# Patient Record
Sex: FEMALE | Race: WHITE | NOT HISPANIC OR LATINO | Employment: UNEMPLOYED | URBAN - METROPOLITAN AREA
[De-identification: names, ages, dates, MRNs, and addresses within clinical notes are randomized per-mention and may not be internally consistent; named-entity substitution may affect disease eponyms.]

---

## 2017-07-03 ENCOUNTER — ANESTHESIA EVENT (OUTPATIENT)
Dept: SURGERY | Facility: CLINIC | Age: 67
End: 2017-07-03

## 2017-07-03 ENCOUNTER — ANESTHESIA (OUTPATIENT)
Dept: SURGERY | Facility: CLINIC | Age: 67
End: 2017-07-03

## 2017-07-03 ENCOUNTER — SURGERY (OUTPATIENT)
Age: 67
End: 2017-07-03

## 2017-07-03 ENCOUNTER — HOSPITAL ENCOUNTER (OUTPATIENT)
Facility: CLINIC | Age: 67
Discharge: HOME | End: 2017-07-03
Attending: DENTIST | Admitting: DENTIST

## 2017-07-03 VITALS
TEMPERATURE: 97.2 F | OXYGEN SATURATION: 93 % | HEIGHT: 69 IN | RESPIRATION RATE: 18 BRPM | SYSTOLIC BLOOD PRESSURE: 140 MMHG | DIASTOLIC BLOOD PRESSURE: 95 MMHG | WEIGHT: 217 LBS | BODY MASS INDEX: 32.14 KG/M2

## 2017-07-03 DIAGNOSIS — K01.1 IMPACTED TOOTH: Primary | ICD-10-CM

## 2017-07-03 LAB — GLUCOSE BLDC GLUCOMTR-MCNC: 120 MG/DL (ref 70–99)

## 2017-07-03 PROCEDURE — 40000170 ZZH STATISTIC PRE-PROCEDURE ASSESSMENT II: Performed by: DENTIST

## 2017-07-03 PROCEDURE — 71000012 ZZH RECOVERY PHASE 1 LEVEL 1 FIRST HR: Performed by: DENTIST

## 2017-07-03 PROCEDURE — 37000008 ZZH ANESTHESIA TECHNICAL FEE, 1ST 30 MIN: Performed by: DENTIST

## 2017-07-03 PROCEDURE — 82962 GLUCOSE BLOOD TEST: CPT

## 2017-07-03 PROCEDURE — 25000132 ZZH RX MED GY IP 250 OP 250 PS 637: Performed by: DENTIST

## 2017-07-03 PROCEDURE — 25000128 H RX IP 250 OP 636: Performed by: NURSE ANESTHETIST, CERTIFIED REGISTERED

## 2017-07-03 PROCEDURE — 25000125 ZZHC RX 250: Performed by: NURSE ANESTHETIST, CERTIFIED REGISTERED

## 2017-07-03 PROCEDURE — 25000128 H RX IP 250 OP 636: Performed by: DENTIST

## 2017-07-03 PROCEDURE — 71000027 ZZH RECOVERY PHASE 2 EACH 15 MINS: Performed by: DENTIST

## 2017-07-03 PROCEDURE — 36000056 ZZH SURGERY LEVEL 3 1ST 30 MIN: Performed by: DENTIST

## 2017-07-03 PROCEDURE — 25000128 H RX IP 250 OP 636: Performed by: ANESTHESIOLOGY

## 2017-07-03 PROCEDURE — 27210794 ZZH OR GENERAL SUPPLY STERILE: Performed by: DENTIST

## 2017-07-03 PROCEDURE — 25000125 ZZHC RX 250: Performed by: DENTIST

## 2017-07-03 PROCEDURE — 36000058 ZZH SURGERY LEVEL 3 EA 15 ADDTL MIN: Performed by: DENTIST

## 2017-07-03 PROCEDURE — 37000009 ZZH ANESTHESIA TECHNICAL FEE, EACH ADDTL 15 MIN: Performed by: DENTIST

## 2017-07-03 PROCEDURE — 27210995 ZZH RX 272: Performed by: DENTIST

## 2017-07-03 RX ORDER — ONDANSETRON 4 MG/1
4 TABLET, ORALLY DISINTEGRATING ORAL EVERY 30 MIN PRN
Status: DISCONTINUED | OUTPATIENT
Start: 2017-07-03 | End: 2017-07-03 | Stop reason: HOSPADM

## 2017-07-03 RX ORDER — ONDANSETRON 4 MG/1
4 TABLET, ORALLY DISINTEGRATING ORAL
Status: DISCONTINUED | OUTPATIENT
Start: 2017-07-03 | End: 2017-07-03 | Stop reason: HOSPADM

## 2017-07-03 RX ORDER — ONDANSETRON 2 MG/ML
4 INJECTION INTRAMUSCULAR; INTRAVENOUS EVERY 30 MIN PRN
Status: DISCONTINUED | OUTPATIENT
Start: 2017-07-03 | End: 2017-07-03 | Stop reason: HOSPADM

## 2017-07-03 RX ORDER — LIDOCAINE HCL/EPINEPHRINE/PF 2%-1:200K
VIAL (ML) INJECTION PRN
Status: DISCONTINUED | OUTPATIENT
Start: 2017-07-03 | End: 2017-07-03 | Stop reason: HOSPADM

## 2017-07-03 RX ORDER — PROPOFOL 10 MG/ML
INJECTION, EMULSION INTRAVENOUS CONTINUOUS PRN
Status: DISCONTINUED | OUTPATIENT
Start: 2017-07-03 | End: 2017-07-03

## 2017-07-03 RX ORDER — CEFAZOLIN SODIUM 2 G/100ML
2 INJECTION, SOLUTION INTRAVENOUS
Status: COMPLETED | OUTPATIENT
Start: 2017-07-03 | End: 2017-07-03

## 2017-07-03 RX ORDER — FENTANYL CITRATE 50 UG/ML
25-50 INJECTION, SOLUTION INTRAMUSCULAR; INTRAVENOUS
Status: DISCONTINUED | OUTPATIENT
Start: 2017-07-03 | End: 2017-07-03 | Stop reason: HOSPADM

## 2017-07-03 RX ORDER — SODIUM CHLORIDE, SODIUM LACTATE, POTASSIUM CHLORIDE, CALCIUM CHLORIDE 600; 310; 30; 20 MG/100ML; MG/100ML; MG/100ML; MG/100ML
INJECTION, SOLUTION INTRAVENOUS CONTINUOUS
Status: DISCONTINUED | OUTPATIENT
Start: 2017-07-03 | End: 2017-07-03 | Stop reason: HOSPADM

## 2017-07-03 RX ORDER — HYDROCODONE BITARTRATE AND ACETAMINOPHEN 5; 325 MG/1; MG/1
1-2 TABLET ORAL
Status: COMPLETED | OUTPATIENT
Start: 2017-07-03 | End: 2017-07-03

## 2017-07-03 RX ORDER — SODIUM CHLORIDE, SODIUM LACTATE, POTASSIUM CHLORIDE, CALCIUM CHLORIDE 600; 310; 30; 20 MG/100ML; MG/100ML; MG/100ML; MG/100ML
INJECTION, SOLUTION INTRAVENOUS CONTINUOUS PRN
Status: DISCONTINUED | OUTPATIENT
Start: 2017-07-03 | End: 2017-07-03

## 2017-07-03 RX ORDER — IBUPROFEN 600 MG/1
600 TABLET, FILM COATED ORAL
Status: DISCONTINUED | OUTPATIENT
Start: 2017-07-03 | End: 2017-07-03 | Stop reason: HOSPADM

## 2017-07-03 RX ORDER — CEFAZOLIN SODIUM 1 G/3ML
1 INJECTION, POWDER, FOR SOLUTION INTRAMUSCULAR; INTRAVENOUS SEE ADMIN INSTRUCTIONS
Status: DISCONTINUED | OUTPATIENT
Start: 2017-07-03 | End: 2017-07-03 | Stop reason: HOSPADM

## 2017-07-03 RX ORDER — IBUPROFEN 600 MG/1
600 TABLET, FILM COATED ORAL EVERY 6 HOURS PRN
Qty: 30 TABLET | Refills: 0 | Status: SHIPPED | OUTPATIENT
Start: 2017-07-03

## 2017-07-03 RX ORDER — NALOXONE HYDROCHLORIDE 0.4 MG/ML
.1-.4 INJECTION, SOLUTION INTRAMUSCULAR; INTRAVENOUS; SUBCUTANEOUS
Status: DISCONTINUED | OUTPATIENT
Start: 2017-07-03 | End: 2017-07-03 | Stop reason: HOSPADM

## 2017-07-03 RX ORDER — MAGNESIUM HYDROXIDE 1200 MG/15ML
LIQUID ORAL PRN
Status: DISCONTINUED | OUTPATIENT
Start: 2017-07-03 | End: 2017-07-03 | Stop reason: HOSPADM

## 2017-07-03 RX ORDER — NEOSTIGMINE METHYLSULFATE 1 MG/ML
VIAL (ML) INJECTION PRN
Status: DISCONTINUED | OUTPATIENT
Start: 2017-07-03 | End: 2017-07-03

## 2017-07-03 RX ORDER — PROPOFOL 10 MG/ML
INJECTION, EMULSION INTRAVENOUS PRN
Status: DISCONTINUED | OUTPATIENT
Start: 2017-07-03 | End: 2017-07-03

## 2017-07-03 RX ORDER — HYDROCODONE BITARTRATE AND ACETAMINOPHEN 5; 325 MG/1; MG/1
1-2 TABLET ORAL EVERY 4 HOURS PRN
Qty: 15 TABLET | Refills: 0 | Status: SHIPPED | OUTPATIENT
Start: 2017-07-03

## 2017-07-03 RX ORDER — MEPERIDINE HYDROCHLORIDE 25 MG/ML
12.5 INJECTION INTRAMUSCULAR; INTRAVENOUS; SUBCUTANEOUS
Status: DISCONTINUED | OUTPATIENT
Start: 2017-07-03 | End: 2017-07-03 | Stop reason: HOSPADM

## 2017-07-03 RX ORDER — LIDOCAINE HYDROCHLORIDE 20 MG/ML
INJECTION, SOLUTION INFILTRATION; PERINEURAL PRN
Status: DISCONTINUED | OUTPATIENT
Start: 2017-07-03 | End: 2017-07-03

## 2017-07-03 RX ORDER — GLYCOPYRROLATE 0.2 MG/ML
INJECTION, SOLUTION INTRAMUSCULAR; INTRAVENOUS PRN
Status: DISCONTINUED | OUTPATIENT
Start: 2017-07-03 | End: 2017-07-03

## 2017-07-03 RX ORDER — AMOXICILLIN 500 MG/1
500 CAPSULE ORAL 3 TIMES DAILY
Qty: 30 CAPSULE | Refills: 0 | Status: SHIPPED | OUTPATIENT
Start: 2017-07-03

## 2017-07-03 RX ORDER — CHLORHEXIDINE GLUCONATE ORAL RINSE 1.2 MG/ML
10 SOLUTION DENTAL ONCE
Status: COMPLETED | OUTPATIENT
Start: 2017-07-03 | End: 2017-07-03

## 2017-07-03 RX ORDER — FENTANYL CITRATE 50 UG/ML
INJECTION, SOLUTION INTRAMUSCULAR; INTRAVENOUS PRN
Status: DISCONTINUED | OUTPATIENT
Start: 2017-07-03 | End: 2017-07-03

## 2017-07-03 RX ADMIN — CHLORHEXIDINE GLUCONATE 15 ML: 1.2 RINSE ORAL at 12:02

## 2017-07-03 RX ADMIN — LIDOCAINE HYDROCHLORIDE 100 MG: 20 INJECTION, SOLUTION INFILTRATION; PERINEURAL at 13:28

## 2017-07-03 RX ADMIN — PROPOFOL: 10 INJECTION, EMULSION INTRAVENOUS at 14:03

## 2017-07-03 RX ADMIN — CEFAZOLIN SODIUM 2 G: 2 INJECTION, SOLUTION INTRAVENOUS at 13:30

## 2017-07-03 RX ADMIN — GLYCOPYRROLATE 0.4 MG: 0.2 INJECTION, SOLUTION INTRAMUSCULAR; INTRAVENOUS at 14:20

## 2017-07-03 RX ADMIN — PROPOFOL 50 MG: 10 INJECTION, EMULSION INTRAVENOUS at 13:33

## 2017-07-03 RX ADMIN — PROPOFOL 200 MCG/KG/MIN: 10 INJECTION, EMULSION INTRAVENOUS at 13:28

## 2017-07-03 RX ADMIN — ROCURONIUM BROMIDE 30 MG: 10 INJECTION INTRAVENOUS at 13:28

## 2017-07-03 RX ADMIN — NEOSTIGMINE METHYLSULFATE 2 MG: 1 INJECTION INTRAMUSCULAR; INTRAVENOUS; SUBCUTANEOUS at 14:20

## 2017-07-03 RX ADMIN — FENTANYL CITRATE 50 MCG: 50 INJECTION, SOLUTION INTRAMUSCULAR; INTRAVENOUS at 13:28

## 2017-07-03 RX ADMIN — HYDROCODONE BITARTRATE AND ACETAMINOPHEN 1 TABLET: 5; 325 TABLET ORAL at 15:05

## 2017-07-03 RX ADMIN — SODIUM CHLORIDE, POTASSIUM CHLORIDE, SODIUM LACTATE AND CALCIUM CHLORIDE: 600; 310; 30; 20 INJECTION, SOLUTION INTRAVENOUS at 13:26

## 2017-07-03 RX ADMIN — LIDOCAINE HYDROCHLORIDE,EPINEPHRINE BITARTRATE 6 ML: 20; .005 INJECTION, SOLUTION EPIDURAL; INFILTRATION; INTRACAUDAL; PERINEURAL at 14:09

## 2017-07-03 RX ADMIN — PROPOFOL 130 MG: 10 INJECTION, EMULSION INTRAVENOUS at 13:28

## 2017-07-03 RX ADMIN — SODIUM CHLORIDE 300 ML: 0.9 IRRIGANT IRRIGATION at 14:11

## 2017-07-03 RX ADMIN — MEPERIDINE HYDROCHLORIDE 12.5 MG: 25 INJECTION, SOLUTION INTRAMUSCULAR; INTRAVENOUS; SUBCUTANEOUS at 15:27

## 2017-07-03 RX ADMIN — FENTANYL CITRATE 50 MCG: 50 INJECTION, SOLUTION INTRAMUSCULAR; INTRAVENOUS at 14:30

## 2017-07-03 ASSESSMENT — ENCOUNTER SYMPTOMS: SEIZURES: 0

## 2017-07-03 NOTE — ANESTHESIA PREPROCEDURE EVALUATION
"Procedure: Procedure(s):  COSMETIC EXTRACTION(S) DENTAL  Preop diagnosis: IMPACTED TEETH, DENTAL ROOT CARIES, PATHOLOGICAL RESORPTION OF TEETH  Allergies   Allergen Reactions     Geodon [Ziprasidone]      Unsure if this is true, pt may be attempting not to take this med per medic report.     Lisinopril      Risperidone      There is no problem list on file for this patient.    Past Medical History:   Diagnosis Date     Anxiety      Arthritis     OA     Diabetes (H)      Gastroesophageal reflux disease      High cholesterol      Hypertension      Insomnia      Melanoma in situ of face (H)      Mixed conductive and sensorineural hearing loss of right ear      Obese      Overactive bladder      Rosacea      Schizoaffective disorder (H)     BIPOLAR TYPE     Past Surgical History:   Procedure Laterality Date     COLONOSCOPY       ENT SURGERY       GENITOURINARY SURGERY         No current facility-administered medications on file prior to encounter.   Current Outpatient Prescriptions on File Prior to Encounter:  METFORMIN HCL PO Take 1,000 mg by mouth 2 times daily (with meals)   Multiple Vitamins-Minerals (MULTIVITAMIN OR) Take by mouth daily   OMEPRAZOLE PO Take 20 mg by mouth every morning   RANITIDINE HCL PO Take 300 mg by mouth At Bedtime   SIMVASTATIN PO Take 20 mg by mouth At Bedtime     /80  Temp 35.6  C (96  F) (Oral)  Resp 20  Ht 1.753 m (5' 9\")  Wt 98.4 kg (217 lb)  SpO2 94%  BMI 32.05 kg/m2    K 4.3  HGB 13.7  ELG - SR, nl axis     Anesthesia Evaluation     . Pt has had prior anesthetic.     No history of anesthetic complications          ROS/MED HX    ENT/Pulmonary: Comment: Nodule left nares per patient.    Breathes better through right nares     (-) sleep apnea and recent URI   Neurologic: Comment: insomnia     (-) seizures, CVA and migraines   Cardiovascular:     (+) Dyslipidemia, hypertension----. : . . . :. .      (-) CHF and WILSON   METS/Exercise Tolerance:     Hematologic:  - neg hematologic  " ROS       Musculoskeletal:   (+) arthritis, , , -       GI/Hepatic:     (+) GERD       Renal/Genitourinary:         Endo:     (+) type II DM Obesity, .      Psychiatric: Comment: schizoaffective disorder    (+) psychiatric history anxiety and depression      Infectious Disease:  - neg infectious disease ROS       Malignancy:         Other: Comment: Hearing loss                    Physical Exam      Airway   Mallampati: II  TM distance: <3 FB  Neck ROM: full  Comment: micrognathic    Dental   Comment: Dental caries    Cardiovascular   Rhythm and rate: regular and normal      Pulmonary    breath sounds clear to auscultation                    Anesthesia Plan      History & Physical Review  History and physical reviewed and following examination; no interval change.    ASA Status:  3 .    NPO Status:  > 8 hours    Plan for General and ETT with Propofol induction. Maintenance will be TIVA.    PONV prophylaxis:  Ondansetron (or other 5HT-3) and Dexamethasone or Solumedrol       Postoperative Care      Consents  Anesthetic plan, risks, benefits and alternatives discussed with:  Patient..                          .

## 2017-07-03 NOTE — IP AVS SNAPSHOT
MRN:[id number]                      After Visit Summary   7/3/2017    Lexy Valenzuela    MRN: [id number]           Thank you!     Thank you for choosing [facility] for your care. Our goal is always to provide you with excellent care. Hearing back from our patients is one way we can continue to improve our services. Please take a few minutes to complete the written survey that you may receive in the mail after you visit with us. Thank you!        Patient Information     Date Of Birth          1950        About your hospital stay     You were admitted on:  July 3, 2017 You last received care in the:  [facility]    You were discharged on:  July 3, 2017       Who to Call     For medical emergencies, please call 911.  For non-urgent questions about your medical care, please call your primary care provider or clinic, [phone]  For questions related to your surgery, please call your surgery clinic        Attending Provider     Provider Specialty    Gualberto London DDS Oral Surgery       Primary Care Provider Office Phone # Fax #    Michael Jimenes [phone]      After Care Instructions     Diet Instructions       Resume pre-procedure diet            Discharge Instructions       Call to make appointment with Dr. London in 7-10 days, [phone]            Dressing       Gauze pack with pressure over extraction site if bleeding, keep in place 30 minutes.  No need for gauze if no active bleeding.            Ice to affected area       Ice to operative site PRN            No lifting        No lifting over 30 lbs and no strenuous physical activity for 1 weeks                  Further instructions from your care team       Same Day Surgery Discharge Instructions for  Sedation and General Anesthesia       It's not unusual to feel dizzy, light-headed or faint for up to 24 hours after surgery or while taking pain medication.  If you have these symptoms:  sit for a few minutes before standing and have someone assist you when you get up to walk or use the bathroom.      You should rest and relax for the next 24 hours. We recommend you make arrangements to have an adult stay with you for at least 24 hours after your discharge.  Avoid hazardous and strenuous activity.      DO NOT DRIVE any vehicle or operate mechanical equipment for 24 hours following the end of your surgery.  Even though you may feel normal, your reactions may be affected by the medication you have received.      Do not drink alcoholic beverages for 24 hours following surgery.       Slowly progress to your regular diet as you feel able. It's not unusual to feel nauseated and/or vomit after receiving anesthesia.  If you develop these symptoms, drink clear liquids (apple juice, ginger ale, broth, 7-up, etc. ) until you feel better.  If your nausea and vomiting persists for 24 hours, please notify your surgeon.        All narcotic pain medications, along with inactivity and anesthesia, can cause constipation. Drinking plenty of liquids and increasing fiber intake will help.      For any questions of a medical nature, call your surgeon.      Do not make important decisions for 24 hours.      If you had general anesthesia, you may have a sore throat for a couple of days related to the breathing tube used during surgery.  You may use Cepacol lozenges to help with this discomfort.  If it worsens or if you develop a fever, contact your surgeon.       If you feel your pain is not well managed with the pain medications prescribed by your surgeon, please contact your surgeon's office to let them know so they can address your concerns.     MOUTH CARE FOLLOWING ORAL SURGERY  [facility]  Orlando Spicer Gulbrandsen, T[place], Elise Sanchez. Surya Pandey Neuner, [place]      Immediately following your surgery:   - When you get home, remove the gauze.  Do not replace unless  "you see active bleeding (pooling/dripping).   - Begin brushing your teeth the evening of surgery.   - Avoid rigorous exercise and get adequate rest for the first two to three days.   - If antibiotics are prescribed, please begin taking these the day of surgery.  If you are nauseated, wait until the next day.   - Do not drive for 24 hours after having I.V. Anesthesia.  Do not drive while taking a prescription pain medication (not including prescription ibuprofen).   - For the first 7 days, avoid smoking, spitting, drinking with straws, or vigorous rinsing.    1.  BLEEDING:  Some oozing may continue for the first 24-48 hours and is not unexpected.  If bleeding persists, apply the gauze directly over the extraction site and bite down firmly for 30 minutes.  You may remove the gauze to eat or drink and replace if needed.  2.  SWELLING:  Apply ice packs on and off for 30 minutes for 24-48 hours after your surgery, excluding overnight, to the outside of your face over the surgery site(s).  Do not apply heat.  Swelling is to be expected following surgery and peaks 2-3 days after.  Elevating your head in the first 48 hours will minimize swelling.  3.  TOBACCO:  Do not smoke or use tobacco products for 7 days.  Smoking greatly increases your risk of infections and dry sockets and will delay healing.  4.  RINSES:  Beginning the day after surgery, dissolve 1/4 teaspoon of salt in a full glass of warm water.  Rinse four times per day (after meals and before bed) for one week.  If given a prescription mouth rinse, use that in addition to salt water rinses for one week, starting the day of surgery.  5.  DIET:  After surgery while you are still numb, eat cool, soft foods.  Once numbness wears off, eat any diet you can tolerate excluding peanuts, popcorn, chips and other crunchy foods that are likely to become \"stuck\" in extraction sockets.  6.  REST & FLUIDS:  After I.V. anesthesia, drink plenty of fluids.  Be sure to get 8-10 " hours of sleep at night.  Do NOT use straws for one week following surgery.  7.  PAIN:  If given prescription ibuprofen, take as directed.  Otherwise we recommend 600mg (three over-the-counter ibuprofen) immediately after surgery and then every 6 hours for two to three days.  The stronger pain medication may be used in addition if necessary (make sure to take with food to avoid nausea).   8.  FEVER:  A low grade fever is likely.  If questionable, do not hesitate to call.  9.  NAUSEA:  Nausea may occur following general anesthesia.  Treat with clear liquids and advance diet as tolerated.  If vomiting is a problem, call our office.  10.  STITCHES:  If stitches were used, they are usually dissolvable.  You will be advised by our office if you have sutures that require removal at a later appointment.  11.  DRY SOCKETS:  Soreness is common for the first couple of days after surgery.  If pain increases (throbbing, waking up at night) after 3-6 days, call our office.  12.  NUMBNESS:  We often use a long-acting local anesthetic that may remain in effect the entire day.        EMERGENCY CALLS  If you have questions or concerns, please call our office between the hours of 7am to 4pm Monday through Friday.  If you have an emergency, please call our office; if during non-business hours, the answering service will contact the doctor on call.  We do not refill pain prescriptions during the evenings or weekends.    [facility]  [phone]      **If you have questions or concerns about your procedure,   call Dr. London at [phone]**            Pending Results     No orders found from 7/1/2017 to 7/4/2017.            Admission Information     Date & Time Provider Department Dept. Phone    7/3/2017 Gualberto London DDS [facility] Same Day Surgery [phone]      Your Vitals Were     Blood Pressure Temperature Respirations Height Weight Pulse  "Oximetry    138/75 97.2  F (36.2  C) 22 1.753 m (5' 9\") 98.4 kg (217 lb) 96%    BMI (Body Mass Index)                   32.05 kg/m2           Good[employer]elly Information     [facility] lets you send messages to your doctor, view your test results, renew your prescriptions, schedule appointments and more. To sign up, go to [email or web address] . Click on \"Log in\" on the left side of the screen, which will take you to the Welcome page. Then click on \"Sign up Now\" on the right side of the page.     You will be asked to enter the access code listed below, as well as some personal information. Please follow the directions to create your username and password.     Your access code is: [email or web address]  Expires: 10/1/2017  2:25 PM     Your access code will  in 90 days. If you need help or a new code, please call your [facility] clinic or [phone].        Care EveryWhere ID     This is your Care EveryWhere ID. This could be used by other organizations to access your [facility] medical records  [id number]        Equal Access to Services     STACIA ECKERT AH: Hadii love Morton, wadorada luasaf, qaybta kaalmada margy, sheila mcdermott. So [facility] [phone].    ATENCIÓN: Si habla español, tiene a ervin disposición servicios gratuitos de asistencia lingüística. Raisa al [phone].    We comply with applicable federal civil rights laws and [place] laws. We do not discriminate on the basis of race, color, national origin, age, disability sex, sexual orientation or gender identity.               Review of your medicines      START taking        Dose / Directions    amoxicillin 500 MG capsule   Commonly known as:  AMOXIL        Dose:  500 mg   Take 1 capsule (500 mg) by mouth 3 times daily   Quantity:  30 capsule   Refills:  0       HYDROcodone-acetaminophen 5-325 MG per tablet   Commonly known as:  NORCO   Notes to Patient:  One norco pain pill given at 3:05 PM        Dose:  1-2 tablet   Take 1-2 " tablets by mouth every 4 hours as needed for other (Moderate to Severe Pain)   Quantity:  15 tablet   Refills:  0       ibuprofen 600 MG tablet   Commonly known as:  ADVIL/MOTRIN        Dose:  600 mg   Take 1 tablet (600 mg) by mouth every 6 hours as needed for pain (mild)   Quantity:  30 tablet   Refills:  0         CONTINUE these medicines which have NOT CHANGED        Dose / Directions    ABILIFY PO        Dose:  20 mg   Take 20 mg by mouth daily   Refills:  0       ASPIRIN PO        Dose:  81 mg   Take 81 mg by mouth daily   Refills:  0       COZAAR PO        Dose:  25 mg   Take 25 mg by mouth daily   Refills:  0       * DEPAKOTE PO        Dose:  500 mg   Take 500 mg by mouth daily (250 MG TABLETS X 2   Refills:  0       * DEPAKOTE PO        Dose:  1500 mg   Take 1,500 mg by mouth At Bedtime (TAKES 3  500 MG TABLETS)   Refills:  0       DETROL LA PO        Dose:  2 mg   Take 2 mg by mouth daily   Refills:  0       METFORMIN HCL PO        Dose:  1000 mg   Take 1,000 mg by mouth 2 times daily (with meals)   Refills:  0       MULTIVITAMIN PO        Take by mouth daily   Refills:  0       OMEPRAZOLE PO        Dose:  20 mg   Take 20 mg by mouth every morning   Refills:  0       RANITIDINE HCL PO        Dose:  300 mg   Take 300 mg by mouth At Bedtime   Refills:  0       SIMVASTATIN PO        Dose:  20 mg   Take 20 mg by mouth At Bedtime   Refills:  0       ULTRAM PO        Dose:  50 mg   Take 50 mg by mouth 2 times daily   Refills:  0       * Notice:  This list has 2 medication(s) that are the same as other medications prescribed for you. Read the directions carefully, and ask your doctor or other care provider to review them with you.         Where to get your medicines      These medications were sent to [facility] - [address]  [address] [place] [address]     Phone:  [phone]     amoxicillin 500 MG capsule    ibuprofen 600 MG tablet         Some of these will  need a paper prescription and others can be bought over the counter. Ask your nurse if you have questions.     Bring a paper prescription for each of these medications     HYDROcodone-acetaminophen 5-325 MG per tablet                Protect others around you: Learn how to safely use, store and throw away your medicines at www.disposemymeds.org.             Medication List: This is a list of all your medications and when to take them. Check marks below indicate your daily home schedule. Keep this list as a reference.      Medications           Morning Afternoon Evening Bedtime As Needed    ABILIFY PO   Take 20 mg by mouth daily                                amoxicillin 500 MG capsule   Commonly known as:  AMOXIL   Take 1 capsule (500 mg) by mouth 3 times daily                                ASPIRIN PO   Take 81 mg by mouth daily                                COZAAR PO   Take 25 mg by mouth daily                                * DEPAKOTE PO   Take 500 mg by mouth daily (250 MG TABLETS X 2                                * DEPAKOTE PO   Take 1,500 mg by mouth At Bedtime (TAKES 3  500 MG TABLETS)                                DETROL LA PO   Take 2 mg by mouth daily                                HYDROcodone-acetaminophen 5-325 MG per tablet   Commonly known as:  NORCO   Take 1-2 tablets by mouth every 4 hours as needed for other (Moderate to Severe Pain)   Last time this was given:  1 tablet on 7/3/2017  3:05 PM   Notes to Patient:  One norco pain pill given at 3:05 PM                                ibuprofen 600 MG tablet   Commonly known as:  ADVIL/MOTRIN   Take 1 tablet (600 mg) by mouth every 6 hours as needed for pain (mild)                                METFORMIN HCL PO   Take 1,000 mg by mouth 2 times daily (with meals)                                MULTIVITAMIN PO   Take by mouth daily                                OMEPRAZOLE PO   Take 20 mg by mouth every morning                                RANITIDINE  HCL PO   Take 300 mg by mouth At Bedtime                                SIMVASTATIN PO   Take 20 mg by mouth At Bedtime                                ULTRAM PO   Take 50 mg by mouth 2 times daily                                * Notice:  This list has 2 medication(s) that are the same as other medications prescribed for you. Read the directions carefully, and ask your doctor or other care provider to review them with you.

## 2017-07-03 NOTE — OP NOTE
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  Clinical and radiographic examination reveals deep recurrent caries under the crown of tooth #31.  Tooth #32 is deeply impacted with distal pericoronal radiolucency, and pathological internal resorption.  The root structure of tooth #32 consists of multiple root trunks diverting and curving in all four directions, with intimate association and undermining of the inferior alveolar canal.  High risk for permanent damage to the inferior alveolar nerve with extraction.  Decision was made to proceed with extraction of tooth #31 and coronectomy of tooth #32.  The patient understands the need for the procedure and agrees to proceed as planned.    Consent: All risks, including but not limited to pain, bleeding, swelling, infection, temporary or permanent numbness to the lip/chin/cheek/tongue or teeth, osteomyelitis, jaw fracture, damage to adjacent structures or teeth, loss of taste, need for further surgery and unforeseen complications, as well as the benefits and alternatives of the procedure, were discussed.  The patient understood and agreed to proceed as planned.  All questions were answered preoperatively.    Procedure: On 7/3/17 the patient was identified and greeted in the preoperative holding area.  Informed consent was obtained, procedure was reviewed.  She was taken to the OR by the anesthesia team, laid supine on table, standard ASA monitors placed, and IV induction into anesthesia with nasotracheal  intubation performed.  Eyes protected.  Tube secured passively.  Time-out called.  Throat pack placed.  Local anesthesia administered, 5cc 2% lidocaine with 1/100,000 epinephrine via inferior alveolar nerve block and local infiltration.  Distobuccal releasing incision made at tooth #31 and extended sulcularly to tooth #30.  Full thickness flap raised.  Tooth #31 extracted intact with #23 cowhorn forcep.  Site curetted and bone smoothed with bone file.  Barrel bur utilized to uncover tooth #32.  Crown was removed with fissure bur.  Barrel bur and fissure bur utilized to reduce remaining tooth structure to past the root furcation.  No mobility of roots noted.  No infection of roots noted.  No visualization of nerve or nerve canal noted.  Distal bone defect curetted.  Bone smoothed with bone file.  Site irrigated copiously.  Closure with 3-0 chromic gut sutures, #31 site closed as well.  Site noted to be hemostatic.  Gauze placed to-go with ribbon attached.  Throat pack removed.  Care of patient handed off to anesthesia team.  Procedure as planned.

## 2017-07-03 NOTE — IP AVS SNAPSHOT
[facility] Same Day [facility]    [address]    [place] [address]    Phone:  [phone]    Fax:  [phone]                                       After Visit Summary   7/3/2017    Lexy Valenzuela    MRN: [id number]           After Visit Summary Signature Page     I have received my discharge instructions, and my questions have been answered. I have discussed any challenges I see with this plan with the nurse or doctor.    ..........................................................................................................................................  Patient/Patient Representative Signature      ..........................................................................................................................................  Patient Representative Print Name and Relationship to Patient    ..................................................               ................................................  Date                                            Time    ..........................................................................................................................................  Reviewed by Signature/Title    ...................................................              ..............................................  Date                                                            Time

## 2017-07-03 NOTE — DISCHARGE INSTRUCTIONS
Same Day Surgery Discharge Instructions for  Sedation and General Anesthesia       It's not unusual to feel dizzy, light-headed or faint for up to 24 hours after surgery or while taking pain medication.  If you have these symptoms: sit for a few minutes before standing and have someone assist you when you get up to walk or use the bathroom.      You should rest and relax for the next 24 hours. We recommend you make arrangements to have an adult stay with you for at least 24 hours after your discharge.  Avoid hazardous and strenuous activity.      DO NOT DRIVE any vehicle or operate mechanical equipment for 24 hours following the end of your surgery.  Even though you may feel normal, your reactions may be affected by the medication you have received.      Do not drink alcoholic beverages for 24 hours following surgery.       Slowly progress to your regular diet as you feel able. It's not unusual to feel nauseated and/or vomit after receiving anesthesia.  If you develop these symptoms, drink clear liquids (apple juice, ginger ale, broth, 7-up, etc. ) until you feel better.  If your nausea and vomiting persists for 24 hours, please notify your surgeon.        All narcotic pain medications, along with inactivity and anesthesia, can cause constipation. Drinking plenty of liquids and increasing fiber intake will help.      For any questions of a medical nature, call your surgeon.      Do not make important decisions for 24 hours.      If you had general anesthesia, you may have a sore throat for a couple of days related to the breathing tube used during surgery.  You may use Cepacol lozenges to help with this discomfort.  If it worsens or if you develop a fever, contact your surgeon.       If you feel your pain is not well managed with the pain medications prescribed by your surgeon, please contact your surgeon's office to let them know so they can address your concerns.     MOUTH CARE FOLLOWING ORAL SURGERY  [facility]  Orlando Spicer, Lucas, Brenna, Elise Sanchez. Surya Pandey [place], [place]      Immediately following your surgery:   - When you get home, remove the gauze.  Do not replace unless you see active bleeding (pooling/dripping).   - Begin brushing your teeth the evening of surgery.   - Avoid rigorous exercise and get adequate rest for the first two to three days.   - If antibiotics are prescribed, please begin taking these the day of surgery.  If you are nauseated, wait until the next day.   - Do not drive for 24 hours after having I.V. Anesthesia.  Do not drive while taking a prescription pain medication (not including prescription ibuprofen).   - For the first 7 days, avoid smoking, spitting, drinking with straws, or vigorous rinsing.    1.  BLEEDING:  Some oozing may continue for the first 24-48 hours and is not unexpected.  If bleeding persists, apply the gauze directly over the extraction site and bite down firmly for 30 minutes.  You may remove the gauze to eat or drink and replace if needed.  2.  SWELLING:  Apply ice packs on and off for 30 minutes for 24-48 hours after your surgery, excluding overnight, to the outside of your face over the surgery site(s).  Do not apply heat.  Swelling is to be expected following surgery and peaks 2-3 days after.  Elevating your head in the first 48 hours will minimize swelling.  3.  TOBACCO:  Do not smoke or use tobacco products for 7 days.  Smoking greatly increases your risk of infections and dry sockets and will delay healing.  4.  RINSES:  Beginning the day after surgery, dissolve 1/4 teaspoon of salt in a full glass of warm water.  Rinse four times per day (after meals and before bed) for one week.  If given a prescription mouth rinse, use that in addition to salt water rinses for one week, starting the day of surgery.  5.  DIET:  After surgery while you are still numb, eat cool, soft foods.  Once numbness wears off, eat  "any diet you can tolerate excluding peanuts, popcorn, chips and other crunchy foods that are likely to become \"stuck\" in extraction sockets.  6.  REST & FLUIDS:  After I.V. anesthesia, drink plenty of fluids.  Be sure to get 8-10 hours of sleep at night.  Do NOT use straws for one week following surgery.  7.  PAIN:  If given prescription ibuprofen, take as directed.  Otherwise we recommend 600mg (three over-the-counter ibuprofen) immediately after surgery and then every 6 hours for two to three days.  The stronger pain medication may be used in addition if necessary (make sure to take with food to avoid nausea).   8.  FEVER:  A low grade fever is likely.  If questionable, do not hesitate to call.  9.  NAUSEA:  Nausea may occur following general anesthesia.  Treat with clear liquids and advance diet as tolerated.  If vomiting is a problem, call our office.  10.  STITCHES:  If stitches were used, they are usually dissolvable.  You will be advised by our office if you have sutures that require removal at a later appointment.  11.  DRY SOCKETS:  Soreness is common for the first couple of days after surgery.  If pain increases (throbbing, waking up at night) after 3-6 days, call our office.  12.  NUMBNESS:  We often use a long-acting local anesthetic that may remain in effect the entire day.        EMERGENCY CALLS  If you have questions or concerns, please call our office between the hours of 7am to 4pm Monday through Friday.  If you have an emergency, please call our office; if during non-business hours, the answering service will contact the doctor on call.  We do not refill pain prescriptions during the evenings or weekends.    Griselda Uribe  [facility]  [phone]      **If you have questions or concerns about your procedure,   call Dr. London at [phone]**          "

## 2017-07-03 NOTE — ANESTHESIA CARE TRANSFER NOTE
Patient: Lexy Valenzuela    Procedure(s):  CORONECTOMY OF TOOTH #32 AND EXTRACTION OF TOOTH #31 - Wound Class: II-Clean Contaminated    Diagnosis: IMPACTED TEETH, DENTAL ROOT CARIES, PATHOLOGICAL RESORPTION OF TEETH  Diagnosis Additional Information: No value filed.    Anesthesia Type:   General, ETT     Note:  Airway :Face Mask  Patient transferred to:PACU  Comments: 1425:  To par responsive.      Vitals: (Last set prior to Anesthesia Care Transfer)    CRNA VITALS  7/3/2017 1355 - 7/3/2017 1425      7/3/2017             NIBP: (!)  153/114    NIBP Mean: 139                Electronically Signed By: HARDIK Tyson CRNA  July 3, 2017  2:25 PM

## 2017-07-03 NOTE — ANESTHESIA POSTPROCEDURE EVALUATION
Patient: Lexy Valenzuela    Procedure(s):  CORONECTOMY OF TOOTH #32 AND EXTRACTION OF TOOTH #31 - Wound Class: II-Clean Contaminated    Diagnosis:IMPACTED TEETH, DENTAL ROOT CARIES, PATHOLOGICAL RESORPTION OF TEETH  Diagnosis Additional Information: No value filed.    Anesthesia Type:  General, ETT    Note:  Anesthesia Post Evaluation    Patient location during evaluation: PACU  Patient participation: Able to fully participate in evaluation  Level of consciousness: awake  Pain management: adequate  Airway patency: patent  Cardiovascular status: acceptable  Respiratory status: acceptable  Hydration status: acceptable  PONV: none     Anesthetic complications: None          Last vitals:  Vitals:    07/03/17 1515 07/03/17 1530 07/03/17 1545   BP: 134/72 (!) 138/92 (!) 140/95   Resp: 18 18 18   Temp: 36.2  C (97.2  F)     SpO2: 96% 97% 93%         Electronically Signed By: Edwin Jansen MD  July 3, 2017  4:38 PM

## 2017-07-03 NOTE — BRIEF OP NOTE
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  Resorption of impacted tooth #32 with distal pericoronal defect.  Coronectomy to roots performed.  Nerve not visualized.

## 2022-06-02 ENCOUNTER — OFFICE VISIT (OUTPATIENT)
Dept: URGENT CARE | Facility: URGENT CARE | Age: 72
End: 2022-06-02
Payer: MEDICARE

## 2022-06-02 ENCOUNTER — ANCILLARY PROCEDURE (OUTPATIENT)
Dept: GENERAL RADIOLOGY | Facility: CLINIC | Age: 72
End: 2022-06-02
Attending: PHYSICIAN ASSISTANT
Payer: MEDICARE

## 2022-06-02 VITALS
SYSTOLIC BLOOD PRESSURE: 138 MMHG | OXYGEN SATURATION: 98 % | WEIGHT: 217 LBS | HEART RATE: 107 BPM | BODY MASS INDEX: 32.05 KG/M2 | DIASTOLIC BLOOD PRESSURE: 84 MMHG | TEMPERATURE: 98.7 F

## 2022-06-02 DIAGNOSIS — M25.532 LEFT WRIST PAIN: ICD-10-CM
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
## 2022-06-02 DIAGNOSIS — S61.459A DOG BITE OF HAND, INITIAL ENCOUNTER: ICD-10-CM

## 2022-06-02 DIAGNOSIS — M25.532 LEFT WRIST PAIN: Primary | ICD-10-CM

## 2022-06-02 PROCEDURE — 73110 X-RAY EXAM OF WRIST: CPT | Mod: TC | Performed by: RADIOLOGY

## 2022-06-02 PROCEDURE — 99203 OFFICE O/P NEW LOW 30 MIN: CPT | Performed by: PHYSICIAN ASSISTANT

## 2022-06-02 NOTE — PROGRESS NOTES
Assessment & Plan     Left wrist pain  Xray negative for acute fx. Ice, rest, elevation. Ibuprofen for pain or swelling.    - XR Wrist Left G/E 3 Views  - amoxicillin-clavulanate (AUGMENTIN) 875-125 MG tablet  Dispense: 14 tablet; Refill: 0    Dog bite of hand, initial encounter  Augmentin prophylaxis given hand involvement.   Close observation.  Seek care for any signs of infection.    Wounds cleaned well and dressed with steri strips.      - amoxicillin-clavulanate (AUGMENTIN) 875-125 MG tablet  Dispense: 14 tablet; Refill: 0     56}    [facility] [place]    Isreal Pugh is a 72 year old female who presents to clinic today for the following health issues:  Chief Complaint   Patient presents with     Urgent Care     Dog Bite     C/O dog bite on hands     HPI  TD 2016         Review of Systems        Objective    /84   Pulse 107   Temp 98.7  F (37.1  C) (Tympanic)   Wt 98.4 kg (217 lb)   SpO2 98%   BMI 32.05 kg/m    Physical Exam exam of BUE reveals wounds as below.     Push fluids, rest and ibuprofen or tylenol for comfort.    Pain with palpation of the L distal wrist, mild dorsal swelling.    Pain with AROM in flexion extension pronation and supination.    Sensation and peripheral pulses intact, cap refill brisk.

## 2022-06-03 NOTE — PATIENT INSTRUCTIONS
Start antibiotics as ordered.    Ice, rest.     Keep wound clean and dry   Wash with warm water and a mild soap such as dove for sensitive skin or ivory 2 x per day.    Apply a small amount of topical antibiotic outmentt, light dressing if draining or open to the air.      If worsening redness, swelling. If any fevers, worsening swelling, pain, any concerns seek care immediately.  If fever or rapidly worsening please seek care in the ER.      Your tetanus is up to date.    Dog must be isolated for 10 days.    If well at that time no concern for rabies.    If dog is ill or euthanized should be tested for rabies and you must seek care if positive for rabies.

## 2023-08-07 ENCOUNTER — TELEPHONE (OUTPATIENT)
Dept: FAMILY MEDICINE | Facility: CLINIC | Age: 73
End: 2023-08-07
Payer: MEDICARE

## 2023-08-07 NOTE — TELEPHONE ENCOUNTER
PT called about a B12 injection question.  No order on chart.  PT thought there was an appt.  Directed pt to call her PCP clinic.  (After call, I see pt most likely called the wrong clinic. PT will be calling the [facility].)  AUGUSTINA Escobar

## (undated) DEVICE — GLOVE PROTEXIS W/NEU-THERA 6.5  2D73TE65

## (undated) DEVICE — GLOVE PROTEXIS W/NEU-THERA 7.5  2D73TE75

## (undated) DEVICE — SPONGE BALL KERLIX ROUND XL W/O STRING LATEX 4935

## (undated) DEVICE — BUR OVAL LINVATEC 7X70MM CARBIDE 5092-236

## (undated) DEVICE — PACK HEAD NECK SEN15HNFSF

## (undated) DEVICE — GOWN LG DISP 9515

## (undated) DEVICE — BUR OVAL LINVATEC 4X8MM 5091-236

## (undated) DEVICE — SOL NACL 0.9% IRRIG 1000ML BOTTLE 07138-09

## (undated) DEVICE — BUR DENTAL 1.75X75MM STRAIGHT 560

## (undated) DEVICE — LINEN TOWEL PACK X5 5464

## (undated) DEVICE — SU CHROMIC 3-0 SH 27" G122H

## (undated) DEVICE — SUCTION CANISTER MEDIVAC LINER 3000ML W/LID 65651-530

## (undated) RX ORDER — FENTANYL CITRATE 50 UG/ML
INJECTION, SOLUTION INTRAMUSCULAR; INTRAVENOUS
Status: DISPENSED
Start: 2017-07-03

## (undated) RX ORDER — HYDROCODONE BITARTRATE AND ACETAMINOPHEN 5; 325 MG/1; MG/1
TABLET ORAL
Status: DISPENSED
Start: 2017-07-03

## (undated) RX ORDER — MEPERIDINE HYDROCHLORIDE 25 MG/ML
INJECTION INTRAMUSCULAR; INTRAVENOUS; SUBCUTANEOUS
Status: DISPENSED
Start: 2017-07-03

## (undated) RX ORDER — LIDOCAINE HYDROCHLORIDE 20 MG/ML
INJECTION, SOLUTION EPIDURAL; INFILTRATION; INTRACAUDAL; PERINEURAL
Status: DISPENSED
Start: 2017-07-03

## (undated) RX ORDER — PROPOFOL 10 MG/ML
INJECTION, EMULSION INTRAVENOUS
Status: DISPENSED
Start: 2017-07-03

## (undated) RX ORDER — LIDOCAINE HYDROCHLORIDE AND EPINEPHRINE BITARTRATE 20; .01 MG/ML; MG/ML
INJECTION, SOLUTION SUBCUTANEOUS
Status: DISPENSED
Start: 2017-07-03

## (undated) RX ORDER — CEFAZOLIN SODIUM 2 G/100ML
INJECTION, SOLUTION INTRAVENOUS
Status: DISPENSED
Start: 2017-07-03

## (undated) RX ORDER — CHLORHEXIDINE GLUCONATE ORAL RINSE 1.2 MG/ML
SOLUTION DENTAL
Status: DISPENSED
Start: 2017-07-03

## (undated) RX ORDER — OXYMETAZOLINE HYDROCHLORIDE 0.05 G/100ML
SPRAY NASAL
Status: DISPENSED
Start: 2017-07-03